# Patient Record
Sex: MALE | Race: OTHER | Employment: UNEMPLOYED | URBAN - METROPOLITAN AREA
[De-identification: names, ages, dates, MRNs, and addresses within clinical notes are randomized per-mention and may not be internally consistent; named-entity substitution may affect disease eponyms.]

---

## 2018-07-26 ENCOUNTER — DOCUMENTATION ONLY (OUTPATIENT)
Dept: INTERNAL MEDICINE CLINIC | Age: 63
End: 2018-07-26

## 2018-07-26 ENCOUNTER — OFFICE VISIT (OUTPATIENT)
Dept: INTERNAL MEDICINE CLINIC | Age: 63
End: 2018-07-26

## 2018-07-26 VITALS
RESPIRATION RATE: 18 BRPM | HEART RATE: 67 BPM | BODY MASS INDEX: 26.54 KG/M2 | TEMPERATURE: 95.9 F | HEIGHT: 62 IN | SYSTOLIC BLOOD PRESSURE: 150 MMHG | WEIGHT: 144.2 LBS | DIASTOLIC BLOOD PRESSURE: 90 MMHG | OXYGEN SATURATION: 97 %

## 2018-07-26 DIAGNOSIS — R01.1 SYSTOLIC MURMUR: ICD-10-CM

## 2018-07-26 DIAGNOSIS — Z72.0 TOBACCO USE: ICD-10-CM

## 2018-07-26 DIAGNOSIS — I10 ESSENTIAL HYPERTENSION: ICD-10-CM

## 2018-07-26 DIAGNOSIS — R53.83 FATIGUE, UNSPECIFIED TYPE: ICD-10-CM

## 2018-07-26 DIAGNOSIS — F11.29 OPIOID DEPENDENCE WITH OPIOID-INDUCED DISORDER (HCC): ICD-10-CM

## 2018-07-26 DIAGNOSIS — K86.1 CHRONIC PANCREATITIS, UNSPECIFIED PANCREATITIS TYPE (HCC): Primary | ICD-10-CM

## 2018-07-26 RX ORDER — IBUPROFEN 200 MG
1 TABLET ORAL EVERY 24 HOURS
Qty: 30 PATCH | Refills: 0 | Status: SHIPPED | OUTPATIENT
Start: 2018-07-26 | End: 2018-08-25

## 2018-07-26 RX ORDER — HYDROMORPHONE HYDROCHLORIDE 4 MG/1
TABLET ORAL
COMMUNITY
End: 2018-07-26

## 2018-07-26 NOTE — MR AVS SNAPSHOT
[address] [place] [address], [address] [place] [address] 
[phone] Patient: Karla Coburn MRN: [id number] :1955 Visit Information Date & Time Provider Department Dept. Phone Encounter #  
 2018 10:00 AM [facility] [phone] [id number] Follow-up Instructions Return in about 3 months (around 10/26/2018) for copmlete physical.  
  
Upcoming Health Maintenance Date Due Hepatitis C Screening 1955 DTaP/Tdap/Td series (1 - Tdap) 10/28/1976 FOBT Q 1 YEAR AGE 50-75 10/28/2005 ZOSTER VACCINE AGE 60> 2015 Influenza Age 5 to Adult 2018 Allergies as of 2018  Review Complete On: 2018 By: Uzma Alonso LPN Not on File Current Immunizations  Never Reviewed No immunizations on file. Not reviewed this visit You Were Diagnosed With   
  
 Codes Comments Tobacco use    -  Primary ICD-10-CM: Z72.0 ICD-9-CM: 305.1 Chronic pancreatitis, unspecified pancreatitis type ([facility]     ICD-10-CM: K86.1 ICD-9-CM: 362.4 Fatigue, unspecified type     ICD-10-CM: R53.83 ICD-9-CM: 780.79 Essential hypertension     ICD-10-CM: I10 
ICD-9-CM: 401.9 Systolic murmur     [address]: R01.1 ICD-9-CM: 785.2 BMI 26.0-26.9,adult     ICD-10-CM: N01.56 
ICD-9-CM: V85.22 Vitals BP Pulse Temp Resp Height(growth percentile) Weight(growth percentile) 150/90 (BP 1 Location: Left arm, BP Patient Position: Sitting) 67 95.9 °F (35.5 °C) (Oral) 18 5' 2\" (1.575 m) 144 lb 3.2 oz (65.4 kg) SpO2 BMI Smoking Status 97% 26.37 kg/m2 Current Every Day Smoker Vitals History BMI and BSA Data Body Mass Index Body Surface Area  
 26.37 kg/m 2 1.69 m 2 Preferred Pharmacy Pharmacy Name Phone [id number], [address] [facility] [phone] Your Updated Medication List  
  
   
This list is accurate as of 18 10:45 AM.  Always use your most recent med list.  
  
  
  
  
 nicotine 14 mg/24 hr patch Commonly known as:  NICODERM CQ  
1 Patch by TransDERmal route every twenty-four (24) hours for 30 days. Prescriptions Sent to Pharmacy Refills  
 nicotine (NICODERM CQ) 14 mg/24 hr patch 0 Si Patch by TransDERmal route every twenty-four (24) hours for 30 days. Class: Normal  
 Pharmacy: [id number] [place] [address] #: [phone] Route: TransDERmal  
  
We Performed the Following CBC WITH AUTOMATED DIFF [80386 CPT(R)] DRUG ABUSE PROF, URINE (SEVEN DRUGS), MS COFIRM [id number] CPT(R)] HEMOGLOBIN A1C WITH EAG [78894 CPT(R)] LIPID PANEL [12383 CPT(R)] METABOLIC PANEL, COMPREHENSIVE [48922 CPT(R)] TSH RFX ON ABNORMAL TO FREE T4 [[id number] Custom] VITAMIN B12 [id number] CPT(R)] VITAMIN D, 25 HYDROXY H0805933 CPT(R)] Follow-up Instructions Return in about 3 months (around 10/26/2018) for copmlete physical.  
  
To-Do List   
 2018 ECHO:  2D ECHO COMPLETE ADULT (TTE) W OR WO CONTR Patient Instructions Call [facility] and schedule with Dr. Barbra Stanton for an appointment earlier than November Schedule your Echocardiogram of your heart ([phone]) Introducing [facility]! [employer] introduces [employer] patient portal. Now you can access parts of your medical record, email your doctor's office, and request medication refills online. 1. In your internet browser, go to [email or web address] 2. Click on the First Time User? Click Here link in the Sign In box. You will see the New Member Sign Up page. 3. Enter your [employer] Access Code exactly as it appears below. You will not need to use this code after youve completed the sign-up process.  If you do not sign up before the expiration date, you must request a new code. · [employer] Access Code: [email or web address] Expires: 10/24/2018 10:28 AM 
 
4. Enter the last four digits of your Social Security Number (xxxx) and Date of Birth (mm/dd/yyyy) as indicated and click Submit. You will be taken to the next sign-up page. 5. Create a [employer] ID. This will be your [employer] login ID and cannot be changed, so think of one that is secure and easy to remember. 6. Create a [employer] password. You can change your password at any time. 7. Enter your Password Reset Question and Answer. This can be used at a later time if you forget your password. 8. Enter your e-mail address. You will receive e-mail notification when new information is available in [address] 9. Click Sign Up. You can now view and download portions of your medical record. 10. Click the Download Summary menu link to download a portable copy of your medical information. If you have questions, please visit the Frequently Asked Questions section of the [employer] website. Remember, [employer] is NOT to be used for urgent needs. For medical emergencies, dial 911. Now available from your iPhone and Android! Please provide this summary of care documentation to your next provider. Your primary care clinician is listed as Nellie Cortez. If you have any questions after today's visit, please call [phone].

## 2018-07-26 NOTE — PROGRESS NOTES
Subjective:      Patrick Castle is a 58 y.o. male who presents today to establish care    No previous primary care    Chronic Pancreatitis:  Diagnosed 2011  Pain upper abdomen radiating to back  Only occasional etoh use, usually makes pain worse  Previously drank a 6 pack several times a week  Does currently smoke- about 1/2 PPD  Interested in tyring chantix  Has never tried a nicotine patch    Taking dilaudid 4mg daily, has been out of this since June. Was tapered off per visit notes    Was being prescribed by Dr. Low Wilson, [facility]  [address] [facility]  Has appt with Dr. Jordi Oneill in 11/2018, patient was unaware of this appointment until now? Is fatigued, not able to provide further details about this  Colonoscopy 4710-9282? At [facility]. Normal.  No polyps    History of DVT 2016 in [facility]  At [facility]? Skin Graft of LLE? Has constant pain in this leg  Tried to work, but cant work due to pain. Is on disability now    Patient is a poor historian, is not forthcoming with history, upset about his pain management and lack of dilaudid. Patient unable/unwilling to provide further medical history or details today in clinic once was made clear that I will not prescribe him narcotics    Denies cardiac history  No chest pain or palpitations  Denies dyspnea    Patient Active Problem List    Diagnosis Date Noted    [facility] 07/26/2018     Current Outpatient Prescriptions   Medication Sig Dispense Refill    nicotine (NICODERM CQ) 14 mg/24 hr patch 1 Patch by TransDERmal route every twenty-four (24) hours for 30 days. 30 Patch 0        Review of Systems    Pertinent items are noted in HPI.      Objective:     Visit Vitals    /90 (BP 1 Location: Left arm, BP Patient Position: Sitting)    Pulse 67    Temp 95.9 °F (35.5 °C) (Oral)    Resp 18    Ht 5' 2\" (1.575 m)    Wt 144 lb 3.2 oz (65.4 kg)    SpO2 97%    BMI 26.37 kg/m2     General appearance: alert, not cooperative or forth coming, no apparent distress, appears stated age  Head: Normocephalic, without obvious abnormality, atraumatic  Lungs: clear to auscultation bilaterally  Heart: regular rate and rhythm, 2/6 systolic murmur  Extremities: extremities normal, healed surgical scare LLE- appears to be from a graft of some sort  Skin: as above  Neurologic: Grossly normal    Assessment/Plan:     1. Chronic pancreatitis, unspecified pancreatitis type ([facility]  - able to retrieve some records from [address] [place] [address]. Appears to have been tapered off of dilaudid  - patient uncooperative, not forthcoming with information once was told that I am unable to write him a prescription for dilaudid today  - will further review records  - patient informed about the need to keep appt with Dr. Jordi Oneill in November, and to call Dr. Casas Clawson office and see if can get an earlier appointment if he feels that he needs this  - reviewed abstainance of etoh and tobacco  - METABOLIC PANEL, COMPREHENSIVE  - CBC WITH AUTOMATED DIFF  - DRUG ABUSE PROF, URINE (SEVEN DRUGS), MS COFIRM    2. Opioid dependence with opioid-induced disorder ([facility]  - as above  - will not prescribe narcotics today  -  reviewed 7/26/2018     3. Fatigue, unspecified type  - VITAMIN E67  - METABOLIC PANEL, COMPREHENSIVE  - CBC WITH AUTOMATED DIFF  - HEMOGLOBIN A1C WITH EAG  - VITAMIN D, 25 HYDROXY  - TSH RFX ON ABNORMAL TO FREE T4    4. Systolic murmur  - educated patient on murmur and need for further evaluation  - METABOLIC PANEL, COMPREHENSIVE  - CBC WITH AUTOMATED DIFF  - 2D ECHO COMPLETE ADULT (TTE) W OR WO CONTR; Future    5. Essential hypertension  - METABOLIC PANEL, COMPREHENSIVE  - CBC WITH AUTOMATED DIFF    6.  Tobacco use  - educated patient on importance of tobacco cessation, total time conversation 5 minutes  - trial nicotine patches, will not prescribe chantix at this time  - nicotine (NICODERM CQ) 14 mg/24 hr patch; 1 Patch by TransDERmal route every twenty-four (24) hours for 30 days. Dispense: 30 Patch; Refill: 0    7. BMI 26.0-26.9,adult  - HEMOGLOBIN A1C WITH EAG  - LIPID PANEL  - TSH RFX ON ABNORMAL TO FREE T4    Advised him to call back or return to office if symptoms worsen/change/persist.  Discussed expected course/resolution/complications of diagnosis in detail with patient. Medication risks/benefits/costs/interactions/alternatives discussed with patient. He was given an after visit summary which includes diagnoses, current medications, & vitals. He expressed understanding with the diagnosis and plan.     [employer]

## 2018-07-26 NOTE — PROGRESS NOTES
Retrieved hospital notes from [facility] medical records portal and forwarded to CJ Fernandez NP for review.

## 2018-07-26 NOTE — PROGRESS NOTES
Reviewed record in preparation for visit and have obtained necessary documentation. Identified pt with two pt identifiers(name and ). Chief Complaint   Patient presents with   [facility] Patient       Health Maintenance Due   Topic Date Due    Hepatitis C Test  1955    DTaP/Tdap/Td  (1 - Tdap) 10/28/1976    Stool testing for trace blood  10/28/2005    Shingles Vaccine  2015       Coordination of Care Questionnaire:  :     1) Have you been to an emergency room, urgent care clinic since your last visit? no   Hospitalized since your last visit? no             2) Have you seen or consulted any other health care providers outside of [address] [place] [address] since your last visit?  yes Dr. Gary Short [facility]   (Include any pap smears or colon screenings in this section.)

## 2018-07-26 NOTE — PATIENT INSTRUCTIONS
Call [facility] and schedule with Dr. Olayinka Uribe for an appointment earlier than November    Schedule your Echocardiogram of your heart ([phone])

## 2018-07-28 LAB
25(OH)D3+25(OH)D2 SERPL-MCNC: 34.1 NG/ML (ref 30–100)
ALBUMIN SERPL-MCNC: 4.8 G/DL (ref 3.6–4.8)
ALBUMIN/GLOB SERPL: 1.5 {RATIO} (ref 1.2–2.2)
ALP SERPL-CCNC: 69 IU/L (ref 39–117)
ALT SERPL-CCNC: 22 IU/L (ref 0–44)
AMPHETAMINES UR QL SCN: NEGATIVE NG/ML
AST SERPL-CCNC: 23 IU/L (ref 0–40)
BARBITURATES UR QL SCN: NEGATIVE NG/ML
BASOPHILS # BLD AUTO: 0 X10E3/UL (ref 0–0.2)
BASOPHILS NFR BLD AUTO: 0 %
BENZODIAZ UR QL: NEGATIVE NG/ML
BILIRUB SERPL-MCNC: 0.5 MG/DL (ref 0–1.2)
BUN SERPL-MCNC: 9 MG/DL (ref 8–27)
BUN/CREAT SERPL: 10 (ref 10–24)
BZE UR QL: NEGATIVE NG/ML
CALCIUM SERPL-MCNC: 9.7 MG/DL (ref 8.6–10.2)
CANNABINOIDS UR QL SCN: NEGATIVE NG/ML
CHLORIDE SERPL-SCNC: 100 MMOL/L (ref 96–106)
CHOLEST SERPL-MCNC: 196 MG/DL (ref 100–199)
CO2 SERPL-SCNC: 22 MMOL/L (ref 20–29)
CREAT SERPL-MCNC: 0.9 MG/DL (ref 0.76–1.27)
EOSINOPHIL # BLD AUTO: 0.2 X10E3/UL (ref 0–0.4)
EOSINOPHIL NFR BLD AUTO: 2 %
ERYTHROCYTE [DISTWIDTH] IN BLOOD BY AUTOMATED COUNT: 13.9 % (ref 12.3–15.4)
EST. AVERAGE GLUCOSE BLD GHB EST-MCNC: 117 MG/DL
GLOBULIN SER CALC-MCNC: 3.1 G/DL (ref 1.5–4.5)
GLUCOSE SERPL-MCNC: 105 MG/DL (ref 65–99)
HBA1C MFR BLD: 5.7 % (ref 4.8–5.6)
HCT VFR BLD AUTO: 41.8 % (ref 37.5–51)
HDLC SERPL-MCNC: 51 MG/DL
HGB BLD-MCNC: 14.5 G/DL (ref 13–17.7)
IMM GRANULOCYTES # BLD: 0 X10E3/UL (ref 0–0.1)
IMM GRANULOCYTES NFR BLD: 0 %
INTERPRETATION, 910389: NORMAL
LDLC SERPL CALC-MCNC: 111 MG/DL (ref 0–99)
LYMPHOCYTES # BLD AUTO: 1.6 X10E3/UL (ref 0.7–3.1)
LYMPHOCYTES NFR BLD AUTO: 17 %
MCH RBC QN AUTO: 30.7 PG (ref 26.6–33)
MCHC RBC AUTO-ENTMCNC: 34.7 G/DL (ref 31.5–35.7)
MCV RBC AUTO: 89 FL (ref 79–97)
MONOCYTES # BLD AUTO: 0.7 X10E3/UL (ref 0.1–0.9)
MONOCYTES NFR BLD AUTO: 8 %
NEUTROPHILS # BLD AUTO: 6.9 X10E3/UL (ref 1.4–7)
NEUTROPHILS NFR BLD AUTO: 73 %
OPIATES UR QL: NEGATIVE NG/ML
PCP UR QL: NEGATIVE NG/ML
PLATELET # BLD AUTO: 185 X10E3/UL (ref 150–379)
POTASSIUM SERPL-SCNC: 4.4 MMOL/L (ref 3.5–5.2)
PROT SERPL-MCNC: 7.9 G/DL (ref 6–8.5)
RBC # BLD AUTO: 4.72 X10E6/UL (ref 4.14–5.8)
SODIUM SERPL-SCNC: 137 MMOL/L (ref 134–144)
TRIGL SERPL-MCNC: 171 MG/DL (ref 0–149)
TSH SERPL DL<=0.005 MIU/L-ACNC: 1.03 UIU/ML (ref 0.45–4.5)
VIT B12 SERPL-MCNC: 1584 PG/ML (ref 232–1245)
VLDLC SERPL CALC-MCNC: 34 MG/DL (ref 5–40)
WBC # BLD AUTO: 9.5 X10E3/UL (ref 3.4–10.8)

## 2018-08-03 PROBLEM — K21.9 GERD (GASTROESOPHAGEAL REFLUX DISEASE): Status: ACTIVE | Noted: 2018-08-03

## 2018-08-03 PROBLEM — Z95.2 HISTORY OF AORTIC VALVE REPLACEMENT: Status: ACTIVE | Noted: 2018-08-03

## 2018-08-03 PROBLEM — I25.10 CAD (CORONARY ARTERY DISEASE): Status: ACTIVE | Noted: 2018-08-03

## 2018-08-03 PROBLEM — I71.20 THORACIC AORTIC ANEURYSM: Status: ACTIVE | Noted: 2018-08-03

## 2018-08-03 PROBLEM — K25.9 GASTRIC ULCER: Status: ACTIVE | Noted: 2018-08-03

## 2018-08-03 PROBLEM — Z98.890 HISTORY OF FASCIOTOMY: Status: ACTIVE | Noted: 2018-08-03

## 2018-08-03 PROBLEM — Z86.010 PERSONAL HISTORY OF COLONIC POLYPS: Status: ACTIVE | Noted: 2018-08-03

## 2018-08-03 PROBLEM — K57.90 DIVERTICULOSIS: Status: ACTIVE | Noted: 2018-08-03

## 2018-08-03 PROBLEM — E78.5 HYPERLIPIDEMIA: Status: ACTIVE | Noted: 2018-08-03

## 2018-08-03 PROBLEM — Z72.0 TOBACCO USE: Status: ACTIVE | Noted: 2018-08-03

## 2018-08-03 PROBLEM — H54.40 BLIND RIGHT EYE: Status: ACTIVE | Noted: 2018-08-03

## 2018-08-03 PROBLEM — K40.20 BILATERAL INGUINAL HERNIA: Status: ACTIVE | Noted: 2018-08-03

## 2018-08-03 PROBLEM — E11.9 TYPE II DIABETES MELLITUS (HCC): Status: ACTIVE | Noted: 2018-08-03

## 2018-08-03 PROBLEM — Z98.890 HISTORY OF EMBOLECTOMY: Status: ACTIVE | Noted: 2018-08-03

## 2018-08-03 PROBLEM — F14.91 HISTORY OF COCAINE USE: Status: ACTIVE | Noted: 2018-08-03

## 2018-08-03 PROBLEM — B19.20 HEPATITIS C: Status: ACTIVE | Noted: 2018-08-03

## 2018-08-03 PROBLEM — G89.29 CHRONIC BACK PAIN: Status: ACTIVE | Noted: 2018-08-03

## 2018-08-03 PROBLEM — I73.9 PVD (PERIPHERAL VASCULAR DISEASE) (HCC): Status: ACTIVE | Noted: 2018-08-03

## 2018-08-03 PROBLEM — Z98.890 HISTORY OF COLONOSCOPY: Status: ACTIVE | Noted: 2018-08-03

## 2018-08-03 PROBLEM — I35.0 AORTIC STENOSIS: Status: ACTIVE | Noted: 2018-08-03

## 2018-08-03 PROBLEM — M54.9 CHRONIC BACK PAIN: Status: ACTIVE | Noted: 2018-08-03

## 2020-12-04 ENCOUNTER — HOSPITAL ENCOUNTER (EMERGENCY)
Age: 65
Discharge: HOME | End: 2020-12-04
Attending: EMERGENCY MEDICINE | Admitting: EMERGENCY MEDICINE
Payer: MEDICARE

## 2020-12-04 VITALS
OXYGEN SATURATION: 96 % | RESPIRATION RATE: 16 BRPM | HEART RATE: 72 BPM | TEMPERATURE: 98.1 F | SYSTOLIC BLOOD PRESSURE: 126 MMHG | DIASTOLIC BLOOD PRESSURE: 82 MMHG

## 2020-12-04 DIAGNOSIS — K08.89 PAIN, DENTAL: Primary | ICD-10-CM

## 2020-12-04 DIAGNOSIS — K02.9 DENTAL CARIES: ICD-10-CM

## 2020-12-04 DIAGNOSIS — J44.9 CHRONIC OBSTRUCTIVE PULMONARY DISEASE, UNSPECIFIED COPD TYPE (HCC): ICD-10-CM

## 2020-12-04 PROCEDURE — 99282 EMERGENCY DEPT VISIT SF MDM: CPT

## 2020-12-04 RX ORDER — PENICILLIN V POTASSIUM 500 MG/1
500 TABLET, FILM COATED ORAL 4 TIMES DAILY
Qty: 28 TAB | Refills: 0 | Status: SHIPPED | OUTPATIENT
Start: 2020-12-04 | End: 2020-12-11

## 2020-12-04 RX ORDER — ALBUTEROL SULFATE 90 UG/1
2 AEROSOL, METERED RESPIRATORY (INHALATION)
Qty: 1 INHALER | Refills: 0 | Status: SHIPPED | OUTPATIENT
Start: 2020-12-04

## 2020-12-04 RX ORDER — PENICILLIN V POTASSIUM 500 MG/1
500 TABLET, FILM COATED ORAL 4 TIMES DAILY
Qty: 28 TAB | Refills: 0 | Status: SHIPPED | OUTPATIENT
Start: 2020-12-04 | End: 2020-12-04

## 2020-12-04 NOTE — ED PROVIDER NOTES
HPI       70y M with R upper tooth pain/swelling. Has been bothering him off and on for a few months. Was told he may need abx and would need to see a dentist. No vomiting. No fever. Eating and drinking well. He also notes a hx of breathing problems (he says COPD) and is out of his albuterol inhaler. He would like a refill. Denies any other pain. Says it feels like the R upper jaw is swollen more over the past 4-5 days. Past Medical History:   Diagnosis Date    CAD (coronary artery disease) 8/3/2018    Chronic pain     Gastric ulcer 8/3/2018    GERD (gastroesophageal reflux disease)     Hyperlipidemia 8/3/2018    Pancreatitis     Pancreatitis chronic 1/31/2012    Type II diabetes [facility] ([facility] 8/3/2018       Past Surgical History:   Procedure Laterality Date    HX AORTIC VALVE REPLACEMENT      2015    HX EMBOLECTOMY      left femoral artery    HX FASCIOTOMY      left leg fasciotomy closure and split- thickness skin graft         History reviewed. No pertinent family history.     Social History     Socioeconomic History    Marital status:      Spouse name: Not on file    Number of children: Not on file    Years of education: Not on file    Highest education level: Not on file   Occupational History    Not on file   Social Needs    Financial resource strain: Not on file    Food insecurity     Worry: Not on file     Inability: Not on file    Transportation needs     Medical: Not on file     Non-medical: Not on file   Tobacco Use    Smoking status: Current Every Day Smoker     Packs/day: 0.50     Years: 10.00     Pack years: 5.00    Smokeless tobacco: Never Used   Substance and Sexual Activity    Alcohol use: No     Alcohol/week: 2.5 standard drinks     Types: 3 Cans of beer per week    Drug use: No    Sexual activity: Never     Partners: Female     Birth control/protection: Condom   Lifestyle    Physical activity     Days per week: Not on file     Minutes per session: Not on file    Stress: Not on file   Relationships    Social connections     Talks on phone: Not on file     Gets together: Not on file     Attends Sikh service: Not on file     Active member of club or organization: Not on file     Attends meetings of clubs or organizations: Not on file     Relationship status: Not on file    Intimate partner violence     Fear of current or ex partner: Not on file     Emotionally abused: Not on file     Physically abused: Not on file     Forced sexual activity: Not on file   Other Topics Concern    Not on file   Social History Narrative    ** Merged History Encounter **              ALLERGIES: Patient has no known allergies. Review of Systems  Review of Systems   Constitutional: (-) weight loss. HEENT: (-) stiff neck   Eyes: (-) discharge. Respiratory: (-) cough. Cardiovascular: (-) syncope. Gastrointestinal: (-) blood in stool. Genitourinary: (-) hematuria. Musculoskeletal: (-) myalgias. Neurological: (-) seizure. Skin: (-) petechiae  Lymph/Immunologic: (-) enlarged lymph nodes  All other systems reviewed and are negative. Vitals:    12/04/20 1007   BP: 126/82   Pulse: 72   Resp: 16   Temp: 98.1 °F (36.7 °C)   SpO2: 96%            Physical Exam Nursing note and vitals reviewed. Constitutional: oriented to person, place, and time. appears well-developed and well-nourished. No distress. Head: Normocephalic and atraumatic. Sclera anicteric  Nose: No rhinorrhea  Mouth/Throat: Oropharynx is clear and moist. Pharynx normal. Poor dentition throughout. Mild swelling to the R upper jaw without tenderness. There is no gum swelling or erythema. Eyes: Conjunctivae are normal. Pupils are equal, round, and reactive to light. Right eye exhibits no discharge. Left eye exhibits no discharge. Neck: Painless normal range of motion. Neck supple. No LAD. Cardiovascular: Normal rate, regular rhythm, normal heart sounds and intact distal pulses.   Exam reveals no gallop and no friction rub. No murmur heard. Pulmonary/Chest:  No respiratory distress. No wheezes. No rales. No rhonchi. No increased work of breathing. No accessory muscle use. Good air exchange throughout. Abdominal: soft, non-tender, no rebound or guarding. No hepatosplenomegaly. Normal bowel sounds throughout. Back: no tenderness to palpation, no deformities, no CVA tenderness  Extremities/Musculoskeletal: Normal range of motion. no tenderness. No edema. Distal extremities are neurovasc intact. Lymphadenopathy:   No adenopathy. Neurological:  Alert and oriented to person, place, and time. Coordination normal. CN 2-12 intact. Motor and sensory function intact. Skin: Skin is warm and dry. No rash noted. No pallor. MDM 70y M here with likely dental infection. Will give rx for penicillin and provide dental resources for follow-up. Will also give rx for albuterol inhaler.        Procedures

## 2020-12-04 NOTE — DISCHARGE INSTRUCTIONS
Patient Education     Emergency [address]   [address] [facility], [place], [address]   Monday, Wednesday, Friday: 8am-5pm  Tuesday and Thursday: 8am-6pm  Phone: [phone]  $70 for Emergency Care  $60 for first routine care, then pay by sliding scale based upon income      [facility] [address], [place], [place]Isael [address]   Phone: [phone], select option (2) to confirm time for treatment     [facility]  [address] [facility], [place], [address]Isael Lee   Monday-Friday: 8am-4pm  Phone: [phone] of Dentistry Urgent [facility], [address] [facility], [facility]Isael Adams Final [facility] [address]  Phone: [phone] to confirm a time for emergency treatment  Pediatrics: [phone]  $75 per tooth, extractions only     [facility]  [address], [address] [address]   Phone [phone] or [phone]  Hours [id number] (extractions)  Simple tooth extraction: $60 per tooth, $55 per x-ray     [facility] (in [place])  [facility] AT [place] only  Phone: [phone], leave message saying you need an appointment to register  Hours: Wed 6-9p       Non-Urgent [facility] ([facility])  [address] [facility], [place] [address]  Phone: [phone]     A.D. [address] [facility]   Dental Clinic: [phone]  Oral Surgery Clinic: [phone]    [address] dental para adultos mayores  Learning About Dental Care for Older Adults  [place] dental para adultos mayores: [place]den [place] 994 dental para las personas [address] es [place] similar al de los adultos Toni kennedy. Sin embargo, [employer] tienen inquietudes que los adultos jóvenes no tienen. Los [employer] pueden tener problemas de enfermedad de las encías y caries en las raíces dentales.  Pueden necesitar que se les reemplacen dientes que solis perdido o se les arreglen empastes rotos. O pueden tener dentaduras postizas que requieren [employer]. Algunos adultos [employer] problemas para sostener un cepillo de dientes. Usted puede ayudar a recordarle a la persona que cuida que se cepille los dientes y se los limpie con hilo dental o que limpie pinto Erika Mould. En algunos casos, quizá tenga que realizar usted el cepillado y otras tareas de cuidado dental. Las personas que tienen dificultad para usar las anastasia o que tienen demencia podrían necesitar esta ayuda adicional.  ¿Cómo puede ayudar usted con el cuidado dental?  Cuidado dental normal  Para mantener saludables los dientes y las encías:  · Cepille los dientes con pasta de dientes con flúor dos veces al día -por la mañana y por la noche- y límpielos con hilo dental por lo menos jeremiah vez al día. La placa se puede acumular rápidamente en los dientes de los adultos [place]. · Preste atención a las señales de enfermedad de las encías. Estas señales incluyen encías que sangran después del cepillado o después de comer alimentos duros, lucas Synchari. · Consulte a un dentista en forma regular. Muchos expertos recomiendan realizar controles dentales cada 6 meses. · Mantenga al dentista informado acerca de cualquier medicamento nuevo que la persona esté tomando. · Fomente jeremiah dieta equilibrada que incluya granos integrales, verduras y frutas, y que sea baja en grasas saturadas y [place]. · Anime a la persona que cuida a no usar productos de tabaco. Pueden afectar la andrew dental y general.  [place] tienen ingresos fijos y creen que no pueden permitirse la atención dental. Alyson la mayoría de las localidades tienen programas en los que los dentistas ayudan a los adultos mayores reduciendo carmita tarifas. Contacte a pinto oficina local de andrew pública o a los servicios sociales para obtener información acerca de la atención dental en pinto área.   Usar un cepillo de dientes  Los [employer] tienen artritis a veces tienen dificultad para cepillarse los dientes porque no pueden sostener el cepillo con facilidad. Lluvia anastasia y dedos pueden estar rígidos, adoloridos o débiles. Si esto sucede, usted puede:  · Ofrecer un cepillo de dientes eléctrico.  · Agrandar el gogo de un cepillo de dientes no eléctrico enrollando jeremiah esponja, jeremiah venda elástica o cinta adhesiva alrededor de él. · Empujar el gogo del cepillo de dientes a través de Kelsy Mangle pelota de goma o de espuma suave. · Vandana [place] y engrosar el gogo adhiriéndole palitos de helado o depresores linguales. También puede comprar cepillos de dientes, expendedores de pasta de dientes y elementos para sostener el hilo dental especiales. El médico puede recomendarle un cepillo de dientes con cerdas blandas si la persona que usted cuida sangra con facilidad. El sangrado puede producirse a causa de un problema de andrew o por ciertos medicamentos. Jeremiah pasta para dientes sensibles puede ayudar si la persona que usted cuida tiene dientes sensibles. ¿Cómo se cepillan y se limpian con hilo dental los dientes de otra persona? Si la persona que cuida tiene dificultad para limpiarse los dientes por pinto cuenta, nicol vez tenga que cepillárselos y limpiárselos usted con hilo dental. Puede ser más fácil hacer que la persona se siente de espaldas a usted, y que usted se siente o se coloque de pie detrás de él o michael. De angélica Patience usted puede estabilizarle la bib contra pinto brazo a la vez que extiende la mano hacia ricardo para pasarle el hilo dental y cepillarle los dientes. Elija un lugar que tenga buena iluminación y sea cómodo para ambos. Antes de comenzar, reúna lluvia suministros. Necesitará guantes, hilo dental, un cepillo de dientes y un recipiente con agua si no está cerca de un lavabo. Lávese y [address] y póngase guantes. Comience por usar el hilo dental:  · Pase suavemente un trozo de hilo dental entre cada heide de los dientes hacia las encías.  Los picos de hilo dental, que están disponibles en la mayoría de las [facility], pueden facilitar esta tarea. · Doble el hilo dental alrededor de cada diente en forma de U y deslícelo suavemente por debajo de la línea de las encías. · Mueva el hilo con firmeza hacia arriba y [place] abajo varias veces, para raspar la placa. Cuando haya terminado de usarlo, tire el hilo usado a la basura y comience el cepillado:  · Moje el cepillo de dientes y aplíquele la pasta de dientes. · Coloque el cepillo en un ángulo de 45 grados, donde los dientes se unen con las encías. Presione con firmeza y [place] el cepillo en círculos pequeños sobre la superficie del diente. · Tenga cuidado de no cepillar demasiado ken. Cepillar con demasiada fuerza puede hacer que las encías se separen de los dientes y puede rayar el esmalte de los dientes. · Cepille todas las superficies de los dientes, del lado de la lengua y del lado de las [address]. Preste especial atención a los dientes frontales y a todas las superficies de los dientes posteriores. · Cepille las superficies de masticación con movimientos suaves y cortos hacia atrás y Janeece Campbell. Marisol vez que haya terminado, ayude a la persona a enjuagarse la boca para eliminar los restos de pasta de dientes. ¿Dónde puede encontrar más información en inglés? Vaya a http://www.cosme.com/  Alice Little [id number] en la búsqueda para aprender más acerca de \"Aprenda acerca del cuidado dental para adultos mayores. \"  Revisado: 9 diciembre, 0669               HGMBNUI del contenido: 12.6  © 5909-9526 Healthwise, Incorporated. Las instrucciones de cuidado fueron adaptadas bajo licencia por [employer] (which disclaims liability or warranty for this information). Si usted tiene [place] afección médica o sobre estas instrucciones, siempre pregunte a pinto profesional de andrew.  [employer] niega toda garantía o responsabilidad por pinto uso de esta información.

## 2020-12-07 ENCOUNTER — PATIENT OUTREACH (OUTPATIENT)
Dept: CASE MANAGEMENT | Age: 65
End: 2020-12-07

## 2020-12-08 ENCOUNTER — PATIENT OUTREACH (OUTPATIENT)
Dept: CASE MANAGEMENT | Age: 65
End: 2020-12-08

## 2020-12-08 NOTE — PROGRESS NOTES
Patient contacted regarding recent discharge and COVID-19 risk. Discussed COVID-19 related testing which was not done at this time. Test results were not done. Patient informed of results, if available? NA. Pt reports that he was tested for COVID19 at the [facility] and the result was negative. Care Transition Nurse/ Ambulatory Care Manager/ LPN Care Coordinator contacted the patient by telephone to perform post discharge assessment. Verified name and  with patient as identifiers. Patient has following risk factors of: diabetes and ED visit to [facility] on [facility]. CTN/ACM/LPN reviewed discharge instructions, medical action plan and red flags related to discharge diagnosis. Reviewed and educated them on any new and changed medications related to discharge diagnosis. Advised obtaining a 90-day supply of all daily and as-needed medications. Advance Care Planning:   Does patient have an Advance Directive: not discussed on this call    Education provided regarding infection prevention, and signs and symptoms of COVID-19 and when to seek medical attention with patient who verbalized understanding. Discussed exposure protocols and quarantine from [address] [place], [place] [address]y you at higher risk for severe illness  and given an opportunity for questions and concerns. The patient agrees to contact the COVID-19 hotline [phone] or PCP office for questions related to their healthcare. CTN/ACM/LPN provided contact information for future reference. From CDC: Are you at higher risk for severe illness?  Wash your hands often.  Avoid close contact (6 feet, which is about two arm lengths) with people who are sick.  Put distance between yourself and other people if COVID-19 is spreading in your community.  Clean and disinfect frequently touched surfaces.  Avoid all cruise travel and non-essential air travel.    Call your healthcare professional if you have concerns about COVID-19 and your underlying condition or if you are sick. For more information on steps you can take to protect yourself, see CDC's How to Protect Yourself      Patient/family/caregiver given information for [facility] Loop and agrees to enroll yes  Patient's preferred e-mail:    Patient's preferred phone number: [phone]  Based on Loop alert triggers, patient will be contacted by nurse care manager for worsening symptoms. Plan for follow-up call in 1-2 days based on severity of symptoms and risk factors. Pt reports that he has ED prescribed medications but states that he needs nebulizer treatment. Pt asked that I call PCP. Attempted to schedule follow up appt but provider is no longer with this group. Pt was given instructions for emergency dental care and states that he will call to have his tooth pulled. Pt reports that he was COVID19 negative at a test by [facility]. Unable to schedule follow up for pt.

## 2020-12-10 ENCOUNTER — PATIENT OUTREACH (OUTPATIENT)
Dept: CASE MANAGEMENT | Age: 65
End: 2020-12-10

## 2020-12-10 NOTE — PROGRESS NOTES
Patient resolved from Transition of Care episode on 12/20/2020. ACM/CTN was unsuccessful at contacting this patient today. Patient/family was provided the following resources and education related to COVID-19 during the initial call:                         Signs, symptoms and red flags related to COVID-19            CDC exposure and quarantine guidelines            [facility]it exposure contact - [phone]            Contact for their local Department of Health                 Patient has not had any additional ED or hospital visits. No further outreach scheduled with this CTN/ACM. Episode of Care resolved. Patient has this CTN/ACM contact information if future needs arise.

## 2021-01-13 ENCOUNTER — TRANSCRIBE ORDER (OUTPATIENT)
Dept: SCHEDULING | Age: 66
End: 2021-01-13

## 2021-01-13 DIAGNOSIS — R91.1 NODULE OF LEFT LUNG: Primary | ICD-10-CM

## 2022-03-18 PROBLEM — G89.29 CHRONIC BACK PAIN: Status: ACTIVE | Noted: 2018-08-03

## 2022-03-18 PROBLEM — M54.9 CHRONIC BACK PAIN: Status: ACTIVE | Noted: 2018-08-03

## 2022-03-18 PROBLEM — K40.20 BILATERAL INGUINAL HERNIA: Status: ACTIVE | Noted: 2018-08-03

## 2022-03-18 PROBLEM — K57.90 DIVERTICULOSIS: Status: ACTIVE | Noted: 2018-08-03

## 2022-03-18 PROBLEM — B19.20 HEPATITIS C: Status: ACTIVE | Noted: 2018-08-03

## 2022-03-18 PROBLEM — K86.1 CHRONIC PANCREATITIS (HCC): Status: ACTIVE | Noted: 2018-07-26

## 2022-03-18 PROBLEM — Z86.010 PERSONAL HISTORY OF COLONIC POLYPS: Status: ACTIVE | Noted: 2018-08-03

## 2022-03-19 PROBLEM — E11.9 TYPE II DIABETES MELLITUS (HCC): Status: ACTIVE | Noted: 2018-08-03

## 2022-03-19 PROBLEM — I71.20 THORACIC AORTIC ANEURYSM (HCC): Status: ACTIVE | Noted: 2018-08-03

## 2022-03-19 PROBLEM — Z72.0 TOBACCO USE: Status: ACTIVE | Noted: 2018-08-03

## 2022-03-19 PROBLEM — K21.9 GERD (GASTROESOPHAGEAL REFLUX DISEASE): Status: ACTIVE | Noted: 2018-08-03

## 2022-03-19 PROBLEM — K25.9 GASTRIC ULCER: Status: ACTIVE | Noted: 2018-08-03

## 2022-03-19 PROBLEM — Z98.890 HISTORY OF FASCIOTOMY: Status: ACTIVE | Noted: 2018-08-03

## 2022-03-19 PROBLEM — I35.0 AORTIC STENOSIS: Status: ACTIVE | Noted: 2018-08-03

## 2022-03-19 PROBLEM — Z98.890 HISTORY OF EMBOLECTOMY: Status: ACTIVE | Noted: 2018-08-03

## 2022-03-19 PROBLEM — I25.10 CAD (CORONARY ARTERY DISEASE): Status: ACTIVE | Noted: 2018-08-03

## 2022-03-19 PROBLEM — Z95.2 HISTORY OF AORTIC VALVE REPLACEMENT: Status: ACTIVE | Noted: 2018-08-03

## 2022-03-19 PROBLEM — E78.5 HYPERLIPIDEMIA: Status: ACTIVE | Noted: 2018-08-03

## 2022-03-20 PROBLEM — F14.91 HISTORY OF COCAINE USE: Status: ACTIVE | Noted: 2018-08-03

## 2022-03-20 PROBLEM — H54.40 BLIND RIGHT EYE: Status: ACTIVE | Noted: 2018-08-03

## 2022-03-20 PROBLEM — I73.9 PVD (PERIPHERAL VASCULAR DISEASE) (HCC): Status: ACTIVE | Noted: 2018-08-03

## 2022-03-20 PROBLEM — Z98.890 HISTORY OF COLONOSCOPY: Status: ACTIVE | Noted: 2018-08-03

## 2023-05-18 RX ORDER — DIAZEPAM 5 MG/1
5 TABLET ORAL EVERY 8 HOURS PRN
COMMUNITY
Start: 2016-04-22

## 2023-05-18 RX ORDER — ALBUTEROL SULFATE 90 UG/1
2 AEROSOL, METERED RESPIRATORY (INHALATION) EVERY 4 HOURS PRN
COMMUNITY
Start: 2020-12-04

## 2023-05-18 RX ORDER — IBUPROFEN 600 MG/1
600 TABLET ORAL EVERY 6 HOURS PRN
COMMUNITY
Start: 2016-04-22

## 2023-05-18 RX ORDER — HYDROMORPHONE HYDROCHLORIDE 4 MG/1
4 TABLET ORAL EVERY 4 HOURS PRN
COMMUNITY